# Patient Record
Sex: MALE | Race: WHITE | NOT HISPANIC OR LATINO | URBAN - NONMETROPOLITAN AREA
[De-identification: names, ages, dates, MRNs, and addresses within clinical notes are randomized per-mention and may not be internally consistent; named-entity substitution may affect disease eponyms.]

---

## 2017-06-26 PROBLEM — H40.013: Noted: 2019-11-13

## 2017-06-26 PROBLEM — H52.13: Noted: 2019-05-16

## 2017-06-26 PROBLEM — H25.13: Noted: 2019-05-16

## 2017-06-26 PROBLEM — H52.4: Noted: 2017-06-26

## 2017-06-26 PROBLEM — H40.003: Noted: 2017-06-26

## 2017-06-26 PROBLEM — H52.223: Noted: 2019-05-16

## 2019-05-16 ENCOUNTER — IMPORTED ENCOUNTER (OUTPATIENT)
Dept: URBAN - NONMETROPOLITAN AREA CLINIC 1 | Facility: CLINIC | Age: 74
End: 2019-05-16

## 2019-05-16 PROCEDURE — 92014 COMPRE OPH EXAM EST PT 1/>: CPT

## 2019-05-16 PROCEDURE — 76514 ECHO EXAM OF EYE THICKNESS: CPT

## 2019-05-16 PROCEDURE — 92015 DETERMINE REFRACTIVE STATE: CPT

## 2019-05-16 PROCEDURE — 92133 CPTRZD OPH DX IMG PST SGM ON: CPT

## 2019-05-16 NOTE — PATIENT DISCUSSION
Borderline Glaucoma OU-  discussed findings w/patient-  IOPs today are 17 17-  Pach done today 5/16/2019   OD: 525   OS: 534- OCT ONH done today 5/16/2019   OD: severe thinning--superiorly & inferiorly           normal RNFL--nasally & temporally   OS: severe thinning--superiorly & inferiorly          normal RNFL--nasally & temporally-  based on OCT ON will need to get 24-2 VF at next visit-  RTC 6 mo f/u w/24-2 VF and GonioCataracts OU-  discussed findings w/patient-  OS worsening more than OD at this time-  patient defers referral for cat eval at this time-  continue to monitor q6 mo or prnSimple Myopia OD/Compound Myopic Astigmatism OS w/Presbyopia-  discussed findings w/patient-  new spectacle Rx issued-  continue to monitor as scheduled or prn; 's Notes: MR 5/16/2019DFE 5/16/2019Pach 525 534 [place] 5/16/2019ABELARDO Robertson 73 5/16/2019

## 2019-11-12 ENCOUNTER — IMPORTED ENCOUNTER (OUTPATIENT)
Dept: URBAN - NONMETROPOLITAN AREA CLINIC 1 | Facility: CLINIC | Age: 74
End: 2019-11-12

## 2019-11-12 PROCEDURE — 92083 EXTENDED VISUAL FIELD XM: CPT

## 2019-11-12 NOTE — PATIENT DISCUSSION
Testing Only-VF 24-2OD: UR scattered scotoma non-specific defectsOS: R scattered scotoma non-specific defectsNo glaucomatous defects noted in either eye; 's Notes:  5/16/2019DFE 5/16/2019[address]ach [address] [facility] Jules Robertson 73 5/16/201924-2 VF 11/12/2019

## 2019-11-13 ENCOUNTER — IMPORTED ENCOUNTER (OUTPATIENT)
Dept: URBAN - NONMETROPOLITAN AREA CLINIC 1 | Facility: CLINIC | Age: 74
End: 2019-11-13

## 2019-11-13 PROCEDURE — 92020 GONIOSCOPY: CPT

## 2019-11-13 PROCEDURE — 99213 OFFICE O/P EST LOW 20 MIN: CPT

## 2019-11-13 NOTE — PATIENT DISCUSSION
Borderline Glaucoma OU-  discussed findings w/patient-  IOPs today are 17 17-  Pach done 5/16/2019   OD: 525   OS: 534- OCT ONH done 5/16/2019   OD: severe thinning--superiorly & inferiorly           normal RNFL--nasally & temporally   OS: severe thinning--superiorly & inferiorly          normal RNFL--nasally & temporally-  VF 24-2 done 11/12/2019OD: UR scattered scotoma non-specific defectsOS: R scattered scotoma non-specific defectsNo glaucomatous defects noted in either eye-  Gonio done today: grade 3 w/heavy pigment-  RTC 6 mo f/u w/OCT ONH Cataracts OU-  discussed findings w/patient-  some progression noted today discussed referral to Yvette Blizzard for Eval in the near future-  patient defers referral for cat eval at this time-  UV protection recommended-  continue to monitor q6 mo w/BAT or prn; 's Notes: MR 5/16/2019DFE 5/16/2019Pach 525 534 [place] 5/16/2019OCT Jules Robertson 73 5/16/201924-2 VF 11/12/2019Gonio 11/13/2019

## 2020-05-13 ENCOUNTER — IMPORTED ENCOUNTER (OUTPATIENT)
Dept: URBAN - NONMETROPOLITAN AREA CLINIC 1 | Facility: CLINIC | Age: 75
End: 2020-05-13

## 2020-05-13 PROCEDURE — 92133 CPTRZD OPH DX IMG PST SGM ON: CPT

## 2020-05-13 PROCEDURE — 99214 OFFICE O/P EST MOD 30 MIN: CPT

## 2020-05-13 NOTE — PATIENT DISCUSSION
Borderline Glaucoma OU-  discussed findings w/patient-  IOPs today are 16 16-  Pach done 5/16/2019   OD: 525   OS: 534- OCT ONH done 5/13/2020 stable   OD: severe thinning--superiorly & inferiorly           normal RNFL--nasally & temporally   OS: severe thinning--superiorly & inferiorly          normal RNFL--nasally & temporally-  VF 24-2 done 11/12/2019OD: UR scattered scotoma non-specific defectsOS: R scattered scotoma non-specific defectsNo glaucomatous defects noted in either eye-  Gonio done 11/13/2019: grade 3 w/heavy pigment-  RTC as scheduled or prn Cataracts OU-  discussed findings w/patient-  visually significant with decreased vision and glare-  strongly recommend referral to Shaun Espinoza for cataract surgery at this time patient agrees with plan-  discussed cataract surgery and the lens options available. Discussed the need for glasses after cataract surgery based on the lens options.   -  refer to Shaun Espinoza for Cat Eval OU; 's Notes: MR 5/16/2019DFE 5/13/2020[address]  Jules Robertson 73 5/13/202024-2 VF 11/12/2019Gonio 11/13/2019

## 2020-08-20 ENCOUNTER — IMPORTED ENCOUNTER (OUTPATIENT)
Dept: URBAN - NONMETROPOLITAN AREA CLINIC 1 | Facility: CLINIC | Age: 75
End: 2020-08-20

## 2020-08-20 PROCEDURE — 92015 DETERMINE REFRACTIVE STATE: CPT

## 2020-08-20 NOTE — PATIENT DISCUSSION
Simple Myopia OD/Compound Myopic Astigmatism OS w/Presbyopia-  discussed findings w/patient-  new spectacle Rx issued-  continue to monitor as scheduled or prn; 's Notes: MR 8/20/2020DFE 5/13/2020Pach 525 534 [place] 5/16/2019OCT [facility] 5/13/202024-2 VF 11/12/2019Gonio 11/13/2019

## 2020-11-01 PROBLEM — H52.13: Noted: 2020-11-01

## 2020-11-01 PROBLEM — H25.13: Noted: 2020-11-01

## 2020-11-01 PROBLEM — H52.223: Noted: 2020-11-01

## 2020-11-01 PROBLEM — H40.013: Noted: 2020-11-01

## 2020-11-01 PROBLEM — H52.4: Noted: 2020-11-01

## 2021-03-03 ENCOUNTER — IMPORTED ENCOUNTER (OUTPATIENT)
Dept: URBAN - NONMETROPOLITAN AREA CLINIC 1 | Facility: CLINIC | Age: 76
End: 2021-03-03

## 2021-03-03 PROCEDURE — 99213 OFFICE O/P EST LOW 20 MIN: CPT

## 2021-03-03 NOTE — PATIENT DISCUSSION
Cataracts OU-  discussed findings w/ patient -  UV protection reccomended-  advanced progression noted today-  discussed referral to Anay Morales for Cat Eval and what to expect from cataract sx-  RTC Refer to Anay Morales for Cat Eval; 's Notes: MR 8/20/2020DFE 3/3/2021[address] [facility]13[facility]202024-2 VF 11/12/2019Gonio 11/13/2019

## 2021-04-09 ENCOUNTER — IMPORTED ENCOUNTER (OUTPATIENT)
Dept: URBAN - NONMETROPOLITAN AREA CLINIC 1 | Facility: CLINIC | Age: 76
End: 2021-04-09

## 2021-04-09 PROBLEM — H40.1421: Noted: 2021-04-09

## 2021-04-09 PROBLEM — H25.13: Noted: 2021-04-09

## 2021-04-09 PROBLEM — H40.013: Noted: 2021-04-09

## 2021-04-09 PROCEDURE — 92014 COMPRE OPH EXAM EST PT 1/>: CPT

## 2021-04-09 NOTE — PATIENT DISCUSSION
Cataract(s)-Visually significant cataract OU .-Cataract(s) causing symptomatic impairment of visual function not correctable with a tolerable change in glasses or contact lenses lighting or non-operative means resulting in specific activity limitations and/or participation restrictions including but not limited to reading viewing television driving or meeting vocational or recreational needs. -Expectation is clearer vision and functional improvement in symptoms as well as reduced glare disability after cataract removal.-Order IOLMaster and OPD today. -Recommend Stand/Trad based on today's OPD testing and lifestyle questionnaire.-All questions were answered regarding surgery including pre and post-op medications appointments activity restrictions and anesthetic usage.-The risks benefits and alternatives and special risk factors for the patient were discussed in detail including but not limited to: bleeding infection retinal detachment vitreous loss problems with the implant and possible need for additional surgery.-Although rare the possibility of complete vision loss was discussed.-The possible need for glasses post-operatively was discussed.-Order medical clearance exam based on history of high cholesterol -Patient elects to proceed with cataract surgery OD . Will schedule at patient's convenience and re-evaluate OS  in the future. Discussed all lens and he elects Stand/Trad . Discussed need for bifocals s/p surgery. Post op inflammation anticipated discussed need for bifocals s/p surgery. Qualifies for LRI OU and explained benefits w/ less dependence on glasses for distance but will need reading glasses. PXF OD /BL GL OS Discussed dx w/ patient in detail Discussed added risks w/ CE OD and he expressed understnading CD 0.7 today  OS 0.65IOP 29 today  much higher than 15 @ last visit. IOP OS 15 OS. Patient to start Latanoprost QD QHS  rx sent to patient pharmacy.  Pt needs updated VF 24-2 and OCT ONH If IOP does not reduce  consider SLT w/ Dr. Lorena Lucia; 's Notes: MR 8/20/2020DFE 3/3/2021[address] [facility] 5/13/202024-2 VF 11/12/2019Gonio 11/13/2019

## 2021-05-19 ENCOUNTER — IMPORTED ENCOUNTER (OUTPATIENT)
Dept: URBAN - NONMETROPOLITAN AREA CLINIC 1 | Facility: CLINIC | Age: 76
End: 2021-05-19

## 2021-05-19 PROBLEM — H25.13: Noted: 2021-05-19

## 2021-05-19 PROBLEM — H40.1421: Noted: 2021-05-19

## 2021-05-19 PROBLEM — Z01.818: Noted: 2021-05-19

## 2021-05-19 PROBLEM — I10: Noted: 2021-05-19

## 2021-05-19 PROBLEM — H40.013: Noted: 2021-05-19

## 2021-05-19 NOTE — PATIENT DISCUSSION
Medical Clearance-Medical clearance done today. -No outstanding concerns that would preclude surgery.-Patient is cleared to proceed with scheduled surgery.; 's Notes: MR 8/20/2020DFE 3/3/2021Pach 525 534 [place] 5/16/2019OCT Jules Robertson 73 5/13/202024-2 VF 11/12/2019Gonio 11/13/2019

## 2021-05-28 ENCOUNTER — IMPORTED ENCOUNTER (OUTPATIENT)
Dept: URBAN - NONMETROPOLITAN AREA CLINIC 1 | Facility: CLINIC | Age: 76
End: 2021-05-28

## 2021-05-28 PROBLEM — Z98.41: Noted: 2021-05-28

## 2021-05-28 PROBLEM — H25.13: Noted: 2021-05-19

## 2021-05-28 PROBLEM — H40.1421: Noted: 2021-05-19

## 2021-05-28 PROBLEM — I10: Noted: 2021-05-19

## 2021-05-28 PROCEDURE — 99024 POSTOP FOLLOW-UP VISIT: CPT

## 2021-05-28 NOTE — PATIENT DISCUSSION
s/p PC IOL OD Stand/Trad 5/27/2021-  discussed findings w/patient-  Pt doing well s/p PCIOL. -  Continue post-op gtts according to instruction sheet and sleep with eye shield over eye for 7 nights. -  Avoid bending at the waist lifting anything over 5lbs and dirty or teresa environments.-  RTC 1 week as scheduled or prn; 's Notes: MR 8/20/2020DFE 3/3/2021Pach 525 534 [place] 5/16/2019OCT Jules Robertson 73 5/13/202024-2 VF 11/12/2019Gonio 11/13/2019

## 2021-06-07 ENCOUNTER — IMPORTED ENCOUNTER (OUTPATIENT)
Dept: URBAN - NONMETROPOLITAN AREA CLINIC 1 | Facility: CLINIC | Age: 76
End: 2021-06-07

## 2021-06-07 PROBLEM — H25.12: Noted: 2021-06-07

## 2021-06-07 PROBLEM — H40.1421: Noted: 2021-05-19

## 2021-06-07 PROBLEM — I10: Noted: 2021-05-19

## 2021-06-07 PROBLEM — Z98.41: Noted: 2021-05-28

## 2021-06-07 PROCEDURE — 99024 POSTOP FOLLOW-UP VISIT: CPT

## 2021-06-07 NOTE — PATIENT DISCUSSION
Cataract(s)-Visually significant cataract OS . -Cataract(s) causing symptomatic impairment of visual function not correctable with a tolerable change in glasses or contact lenses lighting or non-operative means resulting in specific activity limitations and/or participation restrictions including but not limited to reading viewing television driving or meeting vocational or recreational needs. -Expectation is clearer vision and functional improvement in symptoms as well as reduced glare disability after cataract removal.-Recommend  Stand/trad based on previous OPD testing and lifestyle questionnaire.-All questions were answered regarding surgery including pre and post-op medications appointments activity restrictions and anesthetic usage.-The risks benefits and alternatives and special risk factors for the patient were discussed in detail including but not limited to: bleeding infection retinal detachment vitreous loss problems with the implant and possible need for additional surgery.-Although rare the possibility of complete vision loss was discussed.-The need for glasses post-operatively was discussed.-Patient elects to proceed with cataract surgery OS .s/p PCIOL-Pt doing well at 1 week s/p PCIOL. -Continue post-op gtts according to instruction sheet.-Okay to resume usual activites and d/c eye shield.-Contiue latanoprost qhs OD; 's Notes: MR 8/20/2020DFE 3/3/2021[address] [facility] 5/13/202024-2 VF 11/12/2019Gonio 11/13/2019

## 2021-06-16 ENCOUNTER — IMPORTED ENCOUNTER (OUTPATIENT)
Dept: URBAN - NONMETROPOLITAN AREA CLINIC 1 | Facility: CLINIC | Age: 76
End: 2021-06-16

## 2021-06-16 PROBLEM — H40.1421: Noted: 2021-06-16

## 2021-06-16 PROBLEM — Z98.42: Noted: 2021-06-16

## 2021-06-16 PROCEDURE — 99024 POSTOP FOLLOW-UP VISIT: CPT

## 2021-06-16 NOTE — PATIENT DISCUSSION
s/p PC IOL OS Stand/Trad 6/14/2021-  discussed findings w/patient-  Pt doing well s/p PCIOL. -  Continue post-op gtts according to instruction sheet and sleep with eye shield over eye for 7 nights. -  Avoid bending at the waist lifting anything over 5lbs and dirty or teresa environments.-  RTC 1 week POV; 's Notes: MR 8/20/2020DFE 3/3/2021Pach 525 [address] [place] 5/16/2019OCT Jules Robertson 73 5/13/202024-2 VF 11/12/2019Gonio 11/13/2019

## 2021-06-21 ENCOUNTER — IMPORTED ENCOUNTER (OUTPATIENT)
Dept: URBAN - NONMETROPOLITAN AREA CLINIC 1 | Facility: CLINIC | Age: 76
End: 2021-06-21

## 2021-06-21 PROCEDURE — 99024 POSTOP FOLLOW-UP VISIT: CPT

## 2021-06-21 NOTE — PATIENT DISCUSSION
s/p PC IOL OS Stand/Trad 6/14/2021-  discussed findings w/patient-Pt doing well at 1 week s/p PCIOL. -Continue post-op gtts according to instruction sheet.-Okay to resume usual activites and d/c eye shield.; 's Notes: MR 8/20/2020DFE 3/3/2021[address] [facility]24-2 VF 11/12/2019Gonio 11/13/2019

## 2021-08-16 ENCOUNTER — IMPORTED ENCOUNTER (OUTPATIENT)
Dept: URBAN - NONMETROPOLITAN AREA CLINIC 1 | Facility: CLINIC | Age: 76
End: 2021-08-16

## 2021-08-16 PROBLEM — H40.1411: Noted: 2021-06-16

## 2021-08-16 PROBLEM — H52.4: Noted: 2021-08-16

## 2021-08-16 PROBLEM — H40.1421: Noted: 2021-06-16

## 2021-08-16 PROBLEM — H52.03: Noted: 2021-08-16

## 2021-08-16 PROBLEM — H52.221: Noted: 2021-08-16

## 2021-08-16 PROBLEM — Z98.42: Noted: 2021-06-16

## 2021-08-16 PROBLEM — Z98.41: Noted: 2021-08-16

## 2021-08-16 PROCEDURE — 92015 DETERMINE REFRACTIVE STATE: CPT

## 2021-08-16 PROCEDURE — 99024 POSTOP FOLLOW-UP VISIT: CPT

## 2021-08-16 NOTE — PATIENT DISCUSSION
s/p PC IOL OS Stand/Trad 6/14/2021-  discussed findings w/patient-  doing well at this time-  continue Latanoprost QHS OD-  new spectacle Rx issued-  RTC 3 mo f/u Pseudophakia or prn; 's Notes: MR 8/16/2021DFE 3/3/2021[address] [facility] Marcelo 73 5/13/202024-2 VF 11/12/2019Gonio 11/13/2019

## 2021-11-18 ENCOUNTER — IMPORTED ENCOUNTER (OUTPATIENT)
Dept: URBAN - NONMETROPOLITAN AREA CLINIC 1 | Facility: CLINIC | Age: 76
End: 2021-11-18

## 2021-11-18 PROCEDURE — 99213 OFFICE O/P EST LOW 20 MIN: CPT

## 2022-04-15 ASSESSMENT — TONOMETRY
OD_IOP_MMHG: 16
OD_IOP_MMHG: 16
OD_IOP_MMHG: 29
OD_IOP_MMHG: 15
OS_IOP_MMHG: 15
OS_IOP_MMHG: 16
OD_IOP_MMHG: 16
OS_IOP_MMHG: 15
OD_IOP_MMHG: 15
OD_IOP_MMHG: 16
OS_IOP_MMHG: 16
OD_IOP_MMHG: 12
OD_IOP_MMHG: 14
OS_IOP_MMHG: 16
OS_IOP_MMHG: 15
OS_IOP_MMHG: 17
OS_IOP_MMHG: 14
OD_IOP_MMHG: 17
OS_IOP_MMHG: 15
OD_IOP_MMHG: 15
OS_IOP_MMHG: 15
OS_IOP_MMHG: 17

## 2022-04-15 ASSESSMENT — VISUAL ACUITY
OS_PH: 20/30
OD_CC: 20/25-1
OS_PH: 20/25+2
OD_PH: 20/40
OS_SC: 20/30-1
OD_PH: 20/30-2
OS_CC: 20/30-1
OD_CC: 20/20
OD_GLARE: 20/100
OD_CC: 20/25
OS_CC: J1
OD_SC: 20/100-1
OD_GLARE: 20/400
OU_CC: 20/20
OD_SC: 20/400-
OD_SC: 20/80
OS_SC: 20/40
OU_SC: 20/20
OD_SC: 20/150
OS_PH: 20/30
OD_CC: 20/30-
OD_CC: J1
OD_SC: 20/20
OU_CC: J1+
OD_SC: 20/60
OD_SC: 20/100-1
OS_GLARE: 20/40
OS_CC: 20/30
OD_PAM: 20/25
OD_PH: 20/40+2
OU_SC: 20/60
OS_SC: 20/50
OS_CC: 20/20-
OS_SC: 20/30
OS_AM: 20/25
OS_GLARE: 20/40
OS_SC: 20/60+1
OD_PH: 20/30
OS_CC: 20/20
OS_AM: 20/25
OD_CC: 20/20-
OS_SC: 20/40+2
OS_GLARE: 20/400
OU_SC: 20/30
OS_SC: 20/20

## 2022-04-15 ASSESSMENT — PACHYMETRY
OS_CT_UM: 534; ADJ: THIN
OD_CT_UM: 525; ADJ: THIN
OS_CT_UM: 534; ADJ: THIN
OD_CT_UM: 525; ADJ: THIN
OS_CT_UM: 534; ADJ: THIN
OD_CT_UM: 525; ADJ: THIN
OS_CT_UM: 534; ADJ: THIN
OD_CT_UM: 525; ADJ: THIN
OS_CT_UM: 534; ADJ: THIN
OD_CT_UM: 525; ADJ: THIN
OS_CT_UM: 534; ADJ: THIN
OD_CT_UM: 525; ADJ: THIN

## 2022-04-15 ASSESSMENT — KERATOMETRY
OD_K1POWER_DIOPTERS: 40.75
OD_K2POWER_DIOPTERS: 41.50
OS_K2POWER_DIOPTERS: 40.75
OS_K1POWER_DIOPTERS: 40.75
OS_AXISANGLE_DEGREES: 000
OD_AXISANGLE_DEGREES: 006

## 2022-05-18 ENCOUNTER — FOLLOW UP (OUTPATIENT)
Dept: URBAN - NONMETROPOLITAN AREA CLINIC 4 | Facility: CLINIC | Age: 77
End: 2022-05-18

## 2022-05-18 DIAGNOSIS — H40.1411: ICD-10-CM

## 2022-05-18 PROCEDURE — 92020 GONIOSCOPY: CPT

## 2022-05-18 PROCEDURE — 99213 OFFICE O/P EST LOW 20 MIN: CPT

## 2022-05-18 PROCEDURE — 92083 EXTENDED VISUAL FIELD XM: CPT

## 2022-05-18 ASSESSMENT — TONOMETRY
OS_IOP_MMHG: 15
OD_IOP_MMHG: 14

## 2022-05-18 ASSESSMENT — VISUAL ACUITY
OU_CC: 20/20
OD_CC: 20/20
OS_CC: 20/20

## 2023-06-12 ENCOUNTER — FOLLOW UP (OUTPATIENT)
Dept: RURAL CLINIC 1 | Facility: CLINIC | Age: 78
End: 2023-06-12

## 2023-06-12 DIAGNOSIS — H40.1131: ICD-10-CM

## 2023-06-12 DIAGNOSIS — H40.1411: ICD-10-CM

## 2023-06-12 PROCEDURE — 92083 EXTENDED VISUAL FIELD XM: CPT

## 2023-06-12 PROCEDURE — 99214 OFFICE O/P EST MOD 30 MIN: CPT

## 2023-06-12 ASSESSMENT — VISUAL ACUITY
OU_CC: 20/20
OD_CC: 20/20
OS_CC: 20/20

## 2023-06-12 ASSESSMENT — TONOMETRY
OS_IOP_MMHG: 14
OD_IOP_MMHG: 14

## 2024-01-22 ENCOUNTER — FOLLOW UP (OUTPATIENT)
Dept: URBAN - NONMETROPOLITAN AREA CLINIC 4 | Facility: CLINIC | Age: 79
End: 2024-01-22

## 2024-01-22 DIAGNOSIS — H40.1411: ICD-10-CM

## 2024-01-22 DIAGNOSIS — H40.1131: ICD-10-CM

## 2024-01-22 DIAGNOSIS — Z96.1: ICD-10-CM

## 2024-01-22 PROCEDURE — 99213 OFFICE O/P EST LOW 20 MIN: CPT

## 2024-01-22 PROCEDURE — 92133 CPTRZD OPH DX IMG PST SGM ON: CPT

## 2024-01-22 ASSESSMENT — VISUAL ACUITY
OS_SC: 20/25+1
OD_SC: 20/20-1

## 2024-01-22 ASSESSMENT — TONOMETRY
OS_IOP_MMHG: 14
OD_IOP_MMHG: 15

## 2025-02-07 ENCOUNTER — COMPREHENSIVE EXAM (OUTPATIENT)
Age: 80
End: 2025-02-07

## 2025-02-07 DIAGNOSIS — H40.1131: ICD-10-CM

## 2025-02-07 DIAGNOSIS — H40.1411: ICD-10-CM

## 2025-02-07 DIAGNOSIS — Z96.1: ICD-10-CM

## 2025-02-07 PROCEDURE — 92133 CPTRZD OPH DX IMG PST SGM ON: CPT

## 2025-02-07 PROCEDURE — 99214 OFFICE O/P EST MOD 30 MIN: CPT

## 2025-08-13 ENCOUNTER — FOLLOW UP (OUTPATIENT)
Age: 80
End: 2025-08-13

## 2025-08-13 DIAGNOSIS — H52.4: ICD-10-CM

## 2025-08-13 DIAGNOSIS — H40.1131: ICD-10-CM

## 2025-08-13 DIAGNOSIS — Z96.1: ICD-10-CM

## 2025-08-13 PROCEDURE — 99213 OFFICE O/P EST LOW 20 MIN: CPT

## 2025-08-13 PROCEDURE — 92083 EXTENDED VISUAL FIELD XM: CPT

## 2025-08-13 PROCEDURE — 92015 DETERMINE REFRACTIVE STATE: CPT

## 2025-08-13 PROCEDURE — 76514 ECHO EXAM OF EYE THICKNESS: CPT
